# Patient Record
Sex: MALE | Race: WHITE
[De-identification: names, ages, dates, MRNs, and addresses within clinical notes are randomized per-mention and may not be internally consistent; named-entity substitution may affect disease eponyms.]

---

## 2018-10-31 ENCOUNTER — HOSPITAL ENCOUNTER (OUTPATIENT)
Dept: HOSPITAL 92 - RAD-FRANK | Age: 83
Discharge: HOME | End: 2018-10-31
Attending: NURSE PRACTITIONER
Payer: MEDICARE

## 2018-10-31 DIAGNOSIS — M79.632: Primary | ICD-10-CM

## 2018-10-31 NOTE — RAD
LEFT FOREARM TWO VIEWS:

 

History: Left forearm pain. Evaluate for foreign body.

 

FINDINGS/IMPRESSION: 

The left radius and ulna are intact. No radiopaque foreign body is seen. 

 

 

POS: [id number]